# Patient Record
Sex: MALE | Race: WHITE | NOT HISPANIC OR LATINO | Employment: OTHER | URBAN - METROPOLITAN AREA
[De-identification: names, ages, dates, MRNs, and addresses within clinical notes are randomized per-mention and may not be internally consistent; named-entity substitution may affect disease eponyms.]

---

## 2018-02-20 ENCOUNTER — CONSULTATION (OUTPATIENT)
Dept: URBAN - METROPOLITAN AREA CLINIC 11 | Facility: CLINIC | Age: 68
End: 2018-02-20

## 2018-02-20 VITALS — DIASTOLIC BLOOD PRESSURE: 70 MMHG | HEIGHT: 60 IN | SYSTOLIC BLOOD PRESSURE: 110 MMHG

## 2018-02-20 ASSESSMENT — TONOMETRY
OD_IOP_MMHG: 11
OS_IOP_MMHG: 13

## 2018-02-20 ASSESSMENT — VISUAL ACUITY
OS_SC: 20/400
OS_PH: 20/40-2
OD_SC: 20/20

## 2018-02-22 ENCOUNTER — IMPORTED ENCOUNTER (OUTPATIENT)
Dept: URBAN - METROPOLITAN AREA CLINIC 9 | Facility: CLINIC | Age: 68
End: 2018-02-22

## 2018-02-28 ENCOUNTER — IMPORTED ENCOUNTER (OUTPATIENT)
Dept: URBAN - METROPOLITAN AREA CLINIC 9 | Facility: CLINIC | Age: 68
End: 2018-02-28

## 2018-03-05 ENCOUNTER — IMPORTED ENCOUNTER (OUTPATIENT)
Dept: URBAN - METROPOLITAN AREA CLINIC 9 | Facility: CLINIC | Age: 68
End: 2018-03-05

## 2018-03-22 ENCOUNTER — IMPORTED ENCOUNTER (OUTPATIENT)
Dept: URBAN - METROPOLITAN AREA CLINIC 9 | Facility: CLINIC | Age: 68
End: 2018-03-22

## 2018-03-26 ENCOUNTER — IMPORTED ENCOUNTER (OUTPATIENT)
Dept: URBAN - METROPOLITAN AREA CLINIC 9 | Facility: CLINIC | Age: 68
End: 2018-03-26

## 2018-04-02 ENCOUNTER — IMPORTED ENCOUNTER (OUTPATIENT)
Dept: URBAN - METROPOLITAN AREA CLINIC 9 | Facility: CLINIC | Age: 68
End: 2018-04-02

## 2018-04-09 ENCOUNTER — IMPORTED ENCOUNTER (OUTPATIENT)
Dept: URBAN - METROPOLITAN AREA CLINIC 9 | Facility: CLINIC | Age: 68
End: 2018-04-09

## 2018-04-19 ENCOUNTER — IMPORTED ENCOUNTER (OUTPATIENT)
Dept: URBAN - METROPOLITAN AREA CLINIC 9 | Facility: CLINIC | Age: 68
End: 2018-04-19

## 2018-10-16 ENCOUNTER — IMPORTED ENCOUNTER (OUTPATIENT)
Dept: URBAN - METROPOLITAN AREA CLINIC 9 | Facility: CLINIC | Age: 68
End: 2018-10-16

## 2018-10-25 ENCOUNTER — IMPORTED ENCOUNTER (OUTPATIENT)
Dept: URBAN - METROPOLITAN AREA CLINIC 9 | Facility: CLINIC | Age: 68
End: 2018-10-25

## 2021-09-28 ENCOUNTER — PREPPED CHART (OUTPATIENT)
Dept: URBAN - METROPOLITAN AREA CLINIC 13 | Facility: CLINIC | Age: 71
End: 2021-09-28

## 2021-10-16 ASSESSMENT — VISUAL ACUITY
OD_CC: 20/25 SN
OS_SC: 20/40 SN
OS_SC: 20/40 SN
OS_SC: 20/60 SN
OS_PH: 20/30 SN
OD_SC: 20/50 SN
OD_SC: 20/50 SN
OD_SC: 20/30 SN
OS_SC: 20/30 SN
OS_PH: 20/30 SN
OS_SC: 20/25 SN
OS_SC: 20/70 SN
OD_SC: 20/30 SN
OS_SC: 20/25 SN
OD_SC: 20/50 SN
OD_SC: 20/50 SN
OS_CC: 20/20 SN

## 2021-10-16 ASSESSMENT — KERATOMETRY
OD_AXISANGLE_DEGREES: 68
OS_AXISANGLE_DEGREES: 142
OD_K2POWER_DIOPTERS: 45.25
OD_AXISANGLE2_DEGREES: 142
OD_K2POWER_DIOPTERS: 46.5
OD_K1POWER_DIOPTERS: 45.75
OS_AXISANGLE2_DEGREES: 8
OD_AXISANGLE_DEGREES: 52
OS_K2POWER_DIOPTERS: 45.75
OS_K1POWER_DIOPTERS: 46.75
OD_AXISANGLE2_DEGREES: 158
OS_K2POWER_DIOPTERS: 45.5
OS_AXISANGLE2_DEGREES: 52
OD_K1POWER_DIOPTERS: 46
OS_AXISANGLE_DEGREES: 98
OS_K1POWER_DIOPTERS: 44.5

## 2021-10-16 ASSESSMENT — TONOMETRY
OD_IOP_MMHG: 15
OS_IOP_MMHG: 8
OD_IOP_MMHG: 12
OD_IOP_MMHG: 13
OS_IOP_MMHG: 13
OS_IOP_MMHG: 8
OD_IOP_MMHG: 10

## 2021-10-26 ENCOUNTER — GLAUCOMA EVAL (OUTPATIENT)
Dept: URBAN - METROPOLITAN AREA CLINIC 13 | Facility: CLINIC | Age: 71
End: 2021-10-26

## 2021-10-26 DIAGNOSIS — H47.233: ICD-10-CM

## 2021-10-26 PROCEDURE — 92083 EXTENDED VISUAL FIELD XM: CPT

## 2021-10-26 PROCEDURE — 92133 CPTRZD OPH DX IMG PST SGM ON: CPT

## 2021-10-26 PROCEDURE — 92012 INTRM OPH EXAM EST PATIENT: CPT

## 2021-10-26 ASSESSMENT — VISUAL ACUITY
OD_SC: 20/40
OS_SC: 20/25
OU_SC: 20/25

## 2021-10-26 ASSESSMENT — TONOMETRY
OD_IOP_MMHG: 09
OS_IOP_MMHG: 10
OS_IOP_MMHG: 15
OD_IOP_MMHG: 11

## 2022-10-21 ENCOUNTER — EMERGENCY VISIT (OUTPATIENT)
Dept: URBAN - METROPOLITAN AREA CLINIC 10 | Facility: CLINIC | Age: 72
End: 2022-10-21

## 2022-10-21 DIAGNOSIS — H43.311: ICD-10-CM

## 2022-10-21 PROCEDURE — 99213 OFFICE O/P EST LOW 20 MIN: CPT

## 2022-10-21 ASSESSMENT — VISUAL ACUITY
OS_SC: 20/40-1
OU_SC: 20/20-2
OD_SC: 20/20-1

## 2022-11-29 ENCOUNTER — ESTABLISHED PATIENT (OUTPATIENT)
Dept: URBAN - METROPOLITAN AREA CLINIC 13 | Facility: CLINIC | Age: 72
End: 2022-11-29

## 2022-11-29 DIAGNOSIS — Z96.1: ICD-10-CM

## 2022-11-29 DIAGNOSIS — H43.311: ICD-10-CM

## 2022-11-29 DIAGNOSIS — H43.812: ICD-10-CM

## 2022-11-29 DIAGNOSIS — H52.4: ICD-10-CM

## 2022-11-29 DIAGNOSIS — H47.233: ICD-10-CM

## 2022-11-29 PROCEDURE — 92250 FUNDUS PHOTOGRAPHY W/I&R: CPT

## 2022-11-29 PROCEDURE — 92014 COMPRE OPH EXAM EST PT 1/>: CPT

## 2022-11-29 PROCEDURE — 92015 DETERMINE REFRACTIVE STATE: CPT

## 2022-11-29 ASSESSMENT — VISUAL ACUITY
OD_SC: 20/50-1
OU_SC: 20/20-1
OS_SC: 20/20-1

## 2022-11-29 ASSESSMENT — TONOMETRY
OD_IOP_MMHG: 14
OS_IOP_MMHG: 13

## 2022-12-07 ENCOUNTER — ESTABLISHED PATIENT (OUTPATIENT)
Dept: URBAN - METROPOLITAN AREA CLINIC 14 | Facility: CLINIC | Age: 72
End: 2022-12-07

## 2022-12-07 PROCEDURE — 99214 OFFICE O/P EST MOD 30 MIN: CPT

## 2022-12-07 ASSESSMENT — VISUAL ACUITY
OD_PH: 20/50
OS_BCVA: 20/20
OD_BCVA: 20/50
OD_SC: 20/70
OS_GLARE: 20/400
OS_SC: 20/25

## 2022-12-07 ASSESSMENT — TONOMETRY
OS_IOP_MMHG: 16
OD_IOP_MMHG: 14

## 2024-09-24 ENCOUNTER — PREPPED CHART (OUTPATIENT)
Dept: URBAN - METROPOLITAN AREA CLINIC 13 | Facility: CLINIC | Age: 74
End: 2024-09-24

## 2024-09-24 ENCOUNTER — EMERGENCY VISIT (OUTPATIENT)
Dept: URBAN - METROPOLITAN AREA CLINIC 13 | Facility: CLINIC | Age: 74
End: 2024-09-24

## 2024-09-24 DIAGNOSIS — H16.143: ICD-10-CM

## 2024-09-24 PROCEDURE — 99214 OFFICE O/P EST MOD 30 MIN: CPT

## 2024-10-08 ENCOUNTER — FOLLOW UP (OUTPATIENT)
Dept: URBAN - METROPOLITAN AREA CLINIC 13 | Facility: CLINIC | Age: 74
End: 2024-10-08

## 2024-10-08 DIAGNOSIS — H16.143: ICD-10-CM

## 2024-10-08 PROCEDURE — 99214 OFFICE O/P EST MOD 30 MIN: CPT
